# Patient Record
Sex: MALE | Race: WHITE | NOT HISPANIC OR LATINO | URBAN - METROPOLITAN AREA
[De-identification: names, ages, dates, MRNs, and addresses within clinical notes are randomized per-mention and may not be internally consistent; named-entity substitution may affect disease eponyms.]

---

## 2019-04-10 ENCOUNTER — INPATIENT (INPATIENT)
Facility: HOSPITAL | Age: 69
LOS: 1 days | Discharge: HOME | End: 2019-04-12
Payer: MEDICARE

## 2019-04-10 VITALS
DIASTOLIC BLOOD PRESSURE: 83 MMHG | SYSTOLIC BLOOD PRESSURE: 185 MMHG | TEMPERATURE: 97 F | HEART RATE: 73 BPM | RESPIRATION RATE: 18 BRPM | OXYGEN SATURATION: 100 %

## 2019-04-10 VITALS
HEART RATE: 61 BPM | OXYGEN SATURATION: 99 % | SYSTOLIC BLOOD PRESSURE: 124 MMHG | TEMPERATURE: 98 F | RESPIRATION RATE: 18 BRPM | DIASTOLIC BLOOD PRESSURE: 65 MMHG

## 2019-04-10 LAB
APTT BLD: 39.5 SEC — HIGH (ref 27–39.2)
INR BLD: 1.11 RATIO — SIGNIFICANT CHANGE UP (ref 0.65–1.3)
PROTHROM AB SERPL-ACNC: 12.7 SEC — SIGNIFICANT CHANGE UP (ref 9.95–12.87)
TROPONIN T SERPL-MCNC: <0.01 NG/ML — SIGNIFICANT CHANGE UP

## 2019-04-10 PROCEDURE — 71046 X-RAY EXAM CHEST 2 VIEWS: CPT | Mod: 26

## 2019-04-10 PROCEDURE — 93458 L HRT ARTERY/VENTRICLE ANGIO: CPT | Mod: 26

## 2019-04-10 PROCEDURE — 93010 ELECTROCARDIOGRAM REPORT: CPT

## 2019-04-10 PROCEDURE — 99285 EMERGENCY DEPT VISIT HI MDM: CPT | Mod: 25

## 2019-04-10 RX ORDER — ENOXAPARIN SODIUM 100 MG/ML
40 INJECTION SUBCUTANEOUS DAILY
Qty: 0 | Refills: 0 | Status: DISCONTINUED | OUTPATIENT
Start: 2019-04-11 | End: 2019-04-12

## 2019-04-10 RX ORDER — ASPIRIN/CALCIUM CARB/MAGNESIUM 324 MG
0 TABLET ORAL
Qty: 0 | Refills: 0 | COMMUNITY

## 2019-04-10 RX ORDER — OMEPRAZOLE 10 MG/1
1 CAPSULE, DELAYED RELEASE ORAL
Qty: 0 | Refills: 0 | COMMUNITY

## 2019-04-10 RX ORDER — SODIUM CHLORIDE 9 MG/ML
1000 INJECTION INTRAMUSCULAR; INTRAVENOUS; SUBCUTANEOUS
Qty: 0 | Refills: 0 | Status: DISCONTINUED | OUTPATIENT
Start: 2019-04-10 | End: 2019-04-12

## 2019-04-10 RX ORDER — ATORVASTATIN CALCIUM 80 MG/1
1 TABLET, FILM COATED ORAL
Qty: 0 | Refills: 0 | COMMUNITY

## 2019-04-10 RX ADMIN — SODIUM CHLORIDE 125 MILLILITER(S): 9 INJECTION INTRAMUSCULAR; INTRAVENOUS; SUBCUTANEOUS at 10:10

## 2019-04-10 NOTE — DISCHARGE NOTE NURSING/CASE MANAGEMENT/SOCIAL WORK - NSDCDPATPORTLINK_GEN_ALL_CORE
You can access the [facility] Patient Portal, offered by [facility], by registering with the following website: [email or web address]

## 2019-04-10 NOTE — H&P ADULT - ASSESSMENT
Patient is a 70 y/o M PMH of HLD, GERD who presents with chest pain. Pt has + stress test as outpatient. CE negative x 2, EKG unchanged.     1) Chest pain  -admit to telemetry  -serial trop  -Pt will be taken today for cardiac cath with   -NPO  -IVF  -EKG rpt in AM  -NTG prn CP  -EKG prn CP   -am cbc, cmp, mg, phos  -ASA and statin continued    2Hyperlipidemia  -c/w statin     3) GERD  -PPI     4)DVT ppx  -Lovenox tomorrow

## 2019-04-10 NOTE — ED PROVIDER NOTE - OBJECTIVE STATEMENT
67 yo m pmhx sig for former smoker, HLD, positive stress test pw quick onset of midsternal pressure mod in severity CP radiating to L shoulder at 2300 with no provoking or modifying factors. The CP is non exertional and non positional. Since the onset of pain pt had some improvement in severity. The CP is not associated with N/V or diaphoresis. No unilateral swelling, hemoptysis, estogen supplementation, malignancy, recent immobilization or surgery, or prior DVT/PE.    I have reviewed available current nursing and previous documentation of past medical, surgical, family, and/or social history.

## 2019-04-10 NOTE — ED ADULT NURSE NOTE - CHPI ED NUR SYMPTOMS NEG
no chills/no syncope/no congestion/no diaphoresis/no fever/no shortness of breath/no nausea/no dizziness/no vomiting/no back pain

## 2019-04-10 NOTE — H&P ADULT - NSHPLABSRESULTS_GEN_ALL_CORE
15.9   5.08  )-----------( 233      ( 10 Apr 2019 04:00 )             47.7       04-10    143  |  105  |  18  ----------------------------<  95  5.0   |  26  |  0.9    Ca    9.8      10 Apr 2019 04:00    TPro  7.2  /  Alb  4.7  /  TBili  0.3  /  DBili  x   /  AST  24  /  ALT  20  /  AlkPhos  67  04-10                  PT/INR - ( 10 Apr 2019 06:26 )   PT: 12.70 sec;   INR: 1.11 ratio         PTT - ( 10 Apr 2019 06:26 )  PTT:39.5 sec    Lactate Trend      CARDIAC MARKERS ( 10 Apr 2019 06:26 )  x     / <0.01 ng/mL / x     / x     / x      CARDIAC MARKERS ( 10 Apr 2019 04:00 )  x     / <0.01 ng/mL / x     / x     / x            CAPILLARY BLOOD GLUCOSE        < from: 12 Lead ECG (04.10.19 @ 03:48) >    Diagnosis Line Sinus rhythm with Premature supraventricular complexes  Incomplete right bundle branch block  Borderline ECG    Confirmed by Leon Hussein ([id number]) on 4/10/2019 7:06:48 AM    < end of copied text >

## 2019-04-10 NOTE — H&P ADULT - HISTORY OF PRESENT ILLNESS
Patient is a 68 y/o M PMH of HLD, GERD who presents with chest pain that began at 10pm last night . Patient describes the site as initially epigastric which he attributed to GERD and ignored. The pain returned to the substernal area, described as a burning type pain, 6/10 intensity with no radiation. He had no associated symptoms and th pain lasted over 1 hour when he called his PMD who instructed him to come to the ED. He admits to intermittent chest pain over the last few month for which his PMD did a stress test which was positive. He denies n/v/f/c, Shortness of breath, weight loss night sweats.

## 2019-04-10 NOTE — H&P ADULT - NSHPREVIEWOFSYSTEMS_GEN_ALL_CORE
REVIEW OF SYSTEMS:    CONSTITUTIONAL: No weakness, fevers or chills  EYES/ENT: No visual changes;  No vertigo or throat pain   NECK: No pain or stiffness  RESPIRATORY: No cough, wheezing, hemoptysis; No shortness of breath  CARDIOVASCULAR: No chest pain or palpitations  GASTROINTESTINAL: No abdominal or epigastric pain. No nausea, vomiting, or hematemesis; No diarrhea or constipation. No melena or hematochezia.  GENITOURINARY: No dysuria, frequency or hematuria  MUSCULOSKELETAL: No joint pains or myalgias  NEUROLOGICAL: No numbness or weakness  SKIN: No itching, rashes

## 2019-04-10 NOTE — ED PROVIDER NOTE - ATTENDING CONTRIBUTION TO CARE
69 yo M, history of HLD, smoking, recent reportedly negative stress by Dr. Hernandez, here for assessment of L sided CP without associated sx. No nausea, vomiting, dizziness, dyspnea.    VS and exam unremarkable, clear lungs, RRR, no LE edema.    Will get EKG, labs, page Dr. Hernandez and reassess

## 2019-04-10 NOTE — ED PROVIDER NOTE - NS ED ROS FT
Review of Systems    Constitutional: (-) fever  Eyes/ENT: (-) change in vision, (-) sore throat, (-) ear pain  Cardiovascular: (-) palpitation   Respiratory: (-) cough, (-) shortness of breath  Gastrointestinal: (-) abdominal pain (-) vomiting, (-) diarrhea  Musculoskeletal: (-) neck pain, (-) back pain, (-) joint pain  Integumentary: (-) rash, (-) edema  Neurological: (-) headache, (-) altered mental status  Heme/Lymph: (-) easy bruising (-) easy bleeding (-) lymphadenopathy  Allergic/Immunologic: (-) pruritus

## 2019-04-10 NOTE — ED PROVIDER NOTE - CLINICAL SUMMARY MEDICAL DECISION MAKING FREE TEXT BOX
Patient remained pain free and had largely unremarkable workup, however, upon speaking to Dr. Hernandez, it turns out that he actually had a POSITIVE stress test and therefore needs admission for cath.

## 2019-04-10 NOTE — ED PROVIDER NOTE - PROGRESS NOTE DETAILS
As per Dr Hernandez pt will be catherised in the AM; pt had a positive stress test earlier in the month, NPO

## 2019-04-10 NOTE — H&P ADULT - NSHPSOCIALHISTORY_GEN_ALL_CORE
Marital Status:  ( x  )    (   ) Single    (   )    (  )   Occupation:   Lives with: (  ) alone  (  ) children   ( x ) spouse   (  ) parents  (  ) other  Recent Travel: no    Substance Use (street drugs): (  x) never used  (  ) other:  Tobacco Usage:  (   ) never smoked   ( x  ) former smoker   (   ) current smoker  (     ) pack year  Alcohol Usage: no  Sexual History:  no

## 2019-04-10 NOTE — CHART NOTE - NSCHARTNOTEFT_GEN_A_CORE
PREOPERATIVE DAY OF PROCEDURE EVALUATION:  I have personally seen and examined the patient.  I agree with the history and physical which I have reviewed and noted any changes below.  (Signed electronically by __________)  04-10-19 @ 18:39 PREOPERATIVE DAY OF PROCEDURE EVALUATION:  I have personally seen and examined the patient.  I agree with the history and physical which I have reviewed and noted any changes below.  ( 04-10-19 @ 18:39

## 2019-04-10 NOTE — ED PROVIDER NOTE - PHYSICAL EXAMINATION
Physical Exam    Vital Signs: I have reviewed the initial vital signs.  Constitutional: well-nourished, appears stated age, no acute distress  Eyes: PERRLA, and symmetrical lids.  ENT: Neck supple with no adenopathy, moist MM.  Cardiovascular: regular rate, regular rhythm, well-perfused extremities  Respiratory: unlabored respiratory effort, clear to auscultation bilaterally  Gastrointestinal: soft, non-tender abdomen, no pulsatile mass  Musculoskeletal: supple neck, no lower extremity edema  Integumentary: warm, dry, no rash  Neurologic: awake, alert, extremities’ motor and sensory functions grossly intact  Psychiatric: A&Ox3, appropriate mood, appropriate affect

## 2019-04-10 NOTE — DISCHARGE NOTE PROVIDER - HOSPITAL COURSE
Patient is a 70 y/o M PMH of HLD, GERD who presents with chest pain that began at 10pm last night . Patient describes the site as initially epigastric which he attributed to GERD and ignored. The pain returned to the substernal area, described as a burning type pain, 6/10 intensity with no radiation. Patient had positive stress. Went for cardiac cath Patient is a 68 y/o M PMH of HLD, GERD who presents with chest pain that began at 10pm last night . Patient describes the site as initially epigastric which he attributed to GERD and ignored. The pain returned to the substernal area, described as a burning type pain, 6/10 intensity with no radiation. Patient had positive stress. Went for cardiac cath which showed no significant CAD. Patient is cleared for discharge with no change to home medications.

## 2019-04-10 NOTE — DISCHARGE NOTE PROVIDER - NSDCCPCAREPLAN_GEN_ALL_CORE_FT
PRINCIPAL DISCHARGE DIAGNOSIS  Diagnosis: Chest pain  Assessment and Plan of Treatment: PRINCIPAL DISCHARGE DIAGNOSIS  Diagnosis: Chest pain  Assessment and Plan of Treatment: Cardiac catheterization negative for occlusive coronary artery disease

## 2019-04-10 NOTE — CHART NOTE - NSCHARTNOTEFT_GEN_A_CORE
Preliminary Cardiac Catheterization Post-Procedure Report:04-10-19 @ 19:06    Procedure Performed:  [x ] Left Heart Catheterization  [ ] Right Heart Catheterization  [ ] Percutaneous Coronary Intervention    Primary Physician: , Dr.Kandov MENON  Assistant(s): Dr. Lei MD    Preliminary Procedure Summary (Official full report to follow)    Pre-procedure diagnosis: abnormal stress test  Post Procedure Diagnosis/Impression: No significant CAD    Left Heart Catheterization:    [ ] Normal Coronary Arteries  [ ] Luminal Irregularities  [ ] ** vessel coronary artery disease   [x ] Non Obstructive CAD    Anesthesia Type  [x] conscious sedation  [x] local/regional anesthesia  [  ] general anesthesia    Estimated Blood Loss  [x ] less than 30 ml    Amount of Contrast used:  25 ml    Access  [ ] Rt. Femoral A  [ ] Rt. Femoral V  [x ] Rt. Radial A  [ ] Rt. Brachial V    Condition of patient after procedure  [x] stable  [  ] guarded  [  ] satisfactory     CATH SUMMARY/FINDINGS:    Dominance:   [x ] Right  [ ] Left  [ ] Co dominant                  LM:     Normal, short  LAD:    Mild diffuse disease in proximal segment                    Diag:   Normal  Cx:      Mild disease  RCA:    minor irregularities   RPL:     minor irregularities  R/LPDA: Normal    [ ] LHC w/ grafts  LIMA to LAD *is patent/*not patent  SVG to  SVG to  Other    Right Heart Catheterization:  PA:  PCW:  CO/CI:    [ ] AS  [ ] AI  [ ] MR  [ ] MS    Specimens obtained: n/a    Implants: ANGELA x *    Follow-up Care:  [x ] D/C Home today  [ ] Return to In-patient bed  [ ] Admit to:  [ ] Return for staged procedure:  [ ] CT Surgery consult called  [ ] DAPT, statin, BB, ACEI  [x ] intensive medical management  [ ] ** EPS/nephrology evaluation requested Preliminary Cardiac Catheterization Post-Procedure Report:04-10-19 @ 19:06    Procedure Performed:  [x ] Left Heart Catheterization  [ ] Right Heart Catheterization  [ ] Percutaneous Coronary Intervention    Primary Physician: , Dr.Kandov MENON  Assistant(s): Dr. Lei MD    Preliminary Procedure Summary (Official full report to follow)    Pre-procedure diagnosis: abnormal stress test  Post Procedure Diagnosis/Impression: No significant CAD    Left Heart Catheterization:    [ ] Normal Coronary Arteries  [ ] Luminal Irregularities  [ ] ** vessel coronary artery disease   [x ] Non Obstructive CAD    Anesthesia Type  [x] conscious sedation  [x] local/regional anesthesia  [  ] general anesthesia    Estimated Blood Loss  [x ] less than 30 ml    Amount of Contrast used:  25 ml    Access  [ ] Rt. Femoral A  [ ] Rt. Femoral V  [x ] Rt. Radial A    Condition of patient after procedure  [x] stable  [  ] guarded  [  ] satisfactory     CATH SUMMARY/FINDINGS:    Dominance:   [x ] Right  [ ] Left  [ ] Co dominant                  LM:     Normal, short  LAD:    Mild diffuse disease in proximal segment                    Diag:   Normal  Cx:      Mild disease  RCA:    minor irregularities   RPL:     minor irregularities  R/LPDA: Normal    [ ] LHC w/ grafts  LIMA to LAD *is patent/*not patent  SVG to  SVG to  Other    Right Heart Catheterization:  PA:  PCW:  CO/CI:    [ ] AS  [ ] AI  [ ] MR  [ ] MS    Specimens obtained: n/a    Implants: ANGELA x *    Follow-up Care:  [x ] D/C Home today  [ ] Return to In-patient bed  [ ] Admit to:  [ ] Return for staged procedure:  [ ] CT Surgery consult called  [ ] DAPT, statin, BB, ACEI  [x ] intensive medical management  [ ] ** EPS/nephrology evaluation requested

## 2019-04-16 DIAGNOSIS — E78.5 HYPERLIPIDEMIA, UNSPECIFIED: ICD-10-CM

## 2019-04-16 DIAGNOSIS — Z87.891 PERSONAL HISTORY OF NICOTINE DEPENDENCE: ICD-10-CM

## 2019-04-16 DIAGNOSIS — K21.9 GASTRO-ESOPHAGEAL REFLUX DISEASE WITHOUT ESOPHAGITIS: ICD-10-CM

## 2019-04-16 DIAGNOSIS — R94.39 ABNORMAL RESULT OF OTHER CARDIOVASCULAR FUNCTION STUDY: ICD-10-CM

## 2019-04-16 DIAGNOSIS — I25.119 ATHEROSCLEROTIC HEART DISEASE OF NATIVE CORONARY ARTERY WITH UNSPECIFIED ANGINA PECTORIS: ICD-10-CM

## 2019-04-16 DIAGNOSIS — R07.89 OTHER CHEST PAIN: ICD-10-CM

## 2019-12-25 NOTE — DISCHARGE NOTE PROVIDER - NSDCHC_MEDRECSTATUS_GEN_ALL_CORE
Patient has been visible on unit  Remains Labile  During interaction patient was calm and controlled  No loud outbursts  Attempting to get a hold of his mother on the patient phone  Denies SI/HI/hallucinations  Will continue to monitor  Admission Reconciliation is Completed  Discharge Reconciliation is Not Complete Admission Reconciliation is Completed  Discharge Reconciliation is Completed

## 2020-07-28 NOTE — ED ADULT NURSE NOTE - RESPIRATORY WDL
Breathing spontaneous and unlabored. Breath sounds clear and equal bilaterally with regular rhythm. TRANSFER

## 2022-08-09 NOTE — ED PROVIDER NOTE - CADM POA PRESS ULCER
[facility]  coding opportunities       Chart reviewed, no opportunity found:   [address]        Patients Insurance     Medicare Insurance: [employer]
No

## 2023-10-18 NOTE — ED ADULT NURSE NOTE - NSFALLRSKASSESASSIST_ED_ALL_ED
Detail Level: Detailed Show Applicator Variable?: Yes Duration Of Freeze Thaw-Cycle (Seconds): 0 Post-Care Instructions: I reviewed with the patient in detail post-care instructions. Patient is to wear sunprotection, and avoid picking at any of the treated lesions. Pt may apply Vaseline to crusted or scabbing areas. Render Post-Care Instructions In Note?: no Consent: The patient's consent was obtained including but not limited to risks of crusting, scabbing, blistering, scarring, darker or lighter pigmentary change, recurrence, incomplete removal and infection. no

## 2025-05-16 PROBLEM — E78.5 HYPERLIPIDEMIA, UNSPECIFIED: Chronic | Status: ACTIVE | Noted: 2019-04-10

## 2025-05-16 PROBLEM — K21.9 GASTRO-ESOPHAGEAL REFLUX DISEASE WITHOUT ESOPHAGITIS: Chronic | Status: ACTIVE | Noted: 2019-04-10

## 2025-05-27 ENCOUNTER — APPOINTMENT (OUTPATIENT)
Dept: SURGERY | Facility: CLINIC | Age: 75
End: 2025-05-27

## 2025-05-27 ENCOUNTER — APPOINTMENT (OUTPATIENT)
Dept: SURGERY | Facility: CLINIC | Age: 75
End: 2025-05-27
Payer: MEDICARE

## 2025-05-27 VITALS
WEIGHT: 170 LBS | HEIGHT: 69 IN | OXYGEN SATURATION: 98 % | BODY MASS INDEX: 25.18 KG/M2 | DIASTOLIC BLOOD PRESSURE: 70 MMHG | SYSTOLIC BLOOD PRESSURE: 132 MMHG | HEART RATE: 71 BPM

## 2025-05-27 DIAGNOSIS — E78.5 HYPERLIPIDEMIA, UNSPECIFIED: ICD-10-CM

## 2025-05-27 DIAGNOSIS — K80.20 CALCULUS OF GALLBLADDER W/OUT CHOLECYSTITIS W/OUT OBSTRUCTION: ICD-10-CM

## 2025-05-27 DIAGNOSIS — K21.9 GASTRO-ESOPHAGEAL REFLUX DISEASE W/OUT ESOPHAGITIS: ICD-10-CM

## 2025-05-27 PROBLEM — Z00.00 ENCOUNTER FOR PREVENTIVE HEALTH EXAMINATION: Status: ACTIVE | Noted: 2025-05-27

## 2025-05-27 PROCEDURE — 99205 OFFICE O/P NEW HI 60 MIN: CPT

## 2025-05-27 RX ORDER — OMEPRAZOLE 40 MG/1
CAPSULE, DELAYED RELEASE ORAL
Refills: 0 | Status: ACTIVE | COMMUNITY

## 2025-05-27 RX ORDER — ATORVASTATIN CALCIUM 20 MG/1
20 TABLET, FILM COATED ORAL
Refills: 0 | Status: ACTIVE | COMMUNITY

## 2025-05-29 ENCOUNTER — NON-APPOINTMENT (OUTPATIENT)
Age: 75
End: 2025-05-29

## 2025-06-09 ENCOUNTER — OUTPATIENT (OUTPATIENT)
Dept: OUTPATIENT SERVICES | Facility: HOSPITAL | Age: 75
LOS: 1 days | End: 2025-06-09
Payer: MEDICARE

## 2025-06-09 VITALS
RESPIRATION RATE: 16 BRPM | HEART RATE: 60 BPM | SYSTOLIC BLOOD PRESSURE: 122 MMHG | HEIGHT: 69 IN | OXYGEN SATURATION: 99 % | WEIGHT: 171.08 LBS | DIASTOLIC BLOOD PRESSURE: 70 MMHG | TEMPERATURE: 97 F

## 2025-06-09 DIAGNOSIS — K80.20 CALCULUS OF GALLBLADDER WITHOUT CHOLECYSTITIS WITHOUT OBSTRUCTION: ICD-10-CM

## 2025-06-09 DIAGNOSIS — Z98.890 OTHER SPECIFIED POSTPROCEDURAL STATES: Chronic | ICD-10-CM

## 2025-06-09 DIAGNOSIS — Z01.818 ENCOUNTER FOR OTHER PREPROCEDURAL EXAMINATION: ICD-10-CM

## 2025-06-09 DIAGNOSIS — M86.10 OTHER ACUTE OSTEOMYELITIS, UNSPECIFIED SITE: Chronic | ICD-10-CM

## 2025-06-09 LAB
ALBUMIN SERPL ELPH-MCNC: 4.6 G/DL — SIGNIFICANT CHANGE UP (ref 3.5–5.2)
ALP SERPL-CCNC: 70 U/L — SIGNIFICANT CHANGE UP (ref 30–115)
ALT FLD-CCNC: 15 U/L — SIGNIFICANT CHANGE UP (ref 0–41)
ANION GAP SERPL CALC-SCNC: 10 MMOL/L — SIGNIFICANT CHANGE UP (ref 7–14)
AST SERPL-CCNC: 22 U/L — SIGNIFICANT CHANGE UP (ref 0–41)
BASOPHILS # BLD AUTO: 0.02 K/UL — SIGNIFICANT CHANGE UP (ref 0–0.2)
BASOPHILS NFR BLD AUTO: 0.4 % — SIGNIFICANT CHANGE UP (ref 0–1)
BILIRUB SERPL-MCNC: 0.5 MG/DL — SIGNIFICANT CHANGE UP (ref 0.2–1.2)
BUN SERPL-MCNC: 14 MG/DL — SIGNIFICANT CHANGE UP (ref 10–20)
CALCIUM SERPL-MCNC: 9.9 MG/DL — SIGNIFICANT CHANGE UP (ref 8.4–10.5)
CHLORIDE SERPL-SCNC: 105 MMOL/L — SIGNIFICANT CHANGE UP (ref 98–110)
CO2 SERPL-SCNC: 24 MMOL/L — SIGNIFICANT CHANGE UP (ref 17–32)
CREAT SERPL-MCNC: 1 MG/DL — SIGNIFICANT CHANGE UP (ref 0.7–1.5)
EGFR: 79 ML/MIN/1.73M2 — SIGNIFICANT CHANGE UP
EGFR: 79 ML/MIN/1.73M2 — SIGNIFICANT CHANGE UP
EOSINOPHIL # BLD AUTO: 0.12 K/UL — SIGNIFICANT CHANGE UP (ref 0–0.7)
EOSINOPHIL NFR BLD AUTO: 2.4 % — SIGNIFICANT CHANGE UP (ref 0–8)
GLUCOSE SERPL-MCNC: 89 MG/DL — SIGNIFICANT CHANGE UP (ref 70–99)
HCT VFR BLD CALC: 49.1 % — SIGNIFICANT CHANGE UP (ref 42–52)
HGB BLD-MCNC: 16.5 G/DL — SIGNIFICANT CHANGE UP (ref 14–18)
IMM GRANULOCYTES NFR BLD AUTO: 0.2 % — SIGNIFICANT CHANGE UP (ref 0.1–0.3)
LYMPHOCYTES # BLD AUTO: 1.52 K/UL — SIGNIFICANT CHANGE UP (ref 1.2–3.4)
LYMPHOCYTES # BLD AUTO: 30.3 % — SIGNIFICANT CHANGE UP (ref 20.5–51.1)
MCHC RBC-ENTMCNC: 31 PG — SIGNIFICANT CHANGE UP (ref 27–31)
MCHC RBC-ENTMCNC: 33.6 G/DL — SIGNIFICANT CHANGE UP (ref 32–37)
MCV RBC AUTO: 92.3 FL — SIGNIFICANT CHANGE UP (ref 80–94)
MONOCYTES # BLD AUTO: 0.37 K/UL — SIGNIFICANT CHANGE UP (ref 0.1–0.6)
MONOCYTES NFR BLD AUTO: 7.4 % — SIGNIFICANT CHANGE UP (ref 1.7–9.3)
NEUTROPHILS # BLD AUTO: 2.98 K/UL — SIGNIFICANT CHANGE UP (ref 1.4–6.5)
NEUTROPHILS NFR BLD AUTO: 59.3 % — SIGNIFICANT CHANGE UP (ref 42.2–75.2)
NRBC BLD AUTO-RTO: 0 /100 WBCS — SIGNIFICANT CHANGE UP (ref 0–0)
PLATELET # BLD AUTO: 226 K/UL — SIGNIFICANT CHANGE UP (ref 130–400)
PMV BLD: 11.4 FL — HIGH (ref 7.4–10.4)
POTASSIUM SERPL-MCNC: 4.7 MMOL/L — SIGNIFICANT CHANGE UP (ref 3.5–5)
POTASSIUM SERPL-SCNC: 4.7 MMOL/L — SIGNIFICANT CHANGE UP (ref 3.5–5)
PROT SERPL-MCNC: 7.4 G/DL — SIGNIFICANT CHANGE UP (ref 6–8)
RBC # BLD: 5.32 M/UL — SIGNIFICANT CHANGE UP (ref 4.7–6.1)
RBC # FLD: 13 % — SIGNIFICANT CHANGE UP (ref 11.5–14.5)
SODIUM SERPL-SCNC: 139 MMOL/L — SIGNIFICANT CHANGE UP (ref 135–146)
WBC # BLD: 5.02 K/UL — SIGNIFICANT CHANGE UP (ref 4.8–10.8)
WBC # FLD AUTO: 5.02 K/UL — SIGNIFICANT CHANGE UP (ref 4.8–10.8)

## 2025-06-09 PROCEDURE — 99214 OFFICE O/P EST MOD 30 MIN: CPT | Mod: 25

## 2025-06-09 PROCEDURE — 80053 COMPREHEN METABOLIC PANEL: CPT

## 2025-06-09 PROCEDURE — 93005 ELECTROCARDIOGRAM TRACING: CPT

## 2025-06-09 PROCEDURE — 36415 COLL VENOUS BLD VENIPUNCTURE: CPT

## 2025-06-09 PROCEDURE — 93010 ELECTROCARDIOGRAM REPORT: CPT

## 2025-06-09 PROCEDURE — 85025 COMPLETE CBC W/AUTO DIFF WBC: CPT

## 2025-06-09 NOTE — H&P PST ADULT - HEART RATE (BEATS/MIN)
"Attempted to contact patient, voicemail state ""customer that you are try to reach is not available at this time, good-bye.\""   Will attempt to call patient at a later time.   " 60

## 2025-06-09 NOTE — H&P PST ADULT - NSICDXPASTMEDICALHX_GEN_ALL_CORE_FT
PAST MEDICAL HISTORY:  GERD without esophagitis     Hyperlipidemia      PAST MEDICAL HISTORY:  AF (atrial fibrillation)     GERD without esophagitis     Hyperlipidemia

## 2025-06-09 NOTE — H&P PST ADULT - NSICDXPASTSURGICALHX_GEN_ALL_CORE_FT
PAST SURGICAL HISTORY:  No significant past surgical history      PAST SURGICAL HISTORY:  Acute osteomyelitis     S/P ablation of atrial fibrillation

## 2025-06-09 NOTE — H&P PST ADULT - HISTORY OF PRESENT ILLNESS
73 Y/O MALE PT TO PAST WITH C/O RUQ PAIN           PT NOW FOR SCHEDULED PROCEDURE ( LAP CHOLEY  )  PT DENIES ANY CP SOB PALP COUGH DYSURIA FEVER URI.   Anesthesia Alert  NO--Difficult Airway  NO--History of neck surgery or radiation  NO--Limited ROM of neck  NO--History of Malignant hyperthermia  NO--Personal or family history of Pseudocholinesterase deficiency.  NO--Prior Anesthesia Complication  NO--Latex Allergy  NO--Loose teeth  NO--History of Rheumatoid Arthritis  NO--KENYON  NO--Bleeding risk  NO--Other_____   73 Y/O MALE PT TO PAST WITH C/O RUQ PAIN , N/V  4/25   PT NOW FOR SCHEDULED PROCEDURE ( LAP CHOLEY  )  PT DENIES ANY CP SOB PALP COUGH DYSURIA FEVER URI.   Anesthesia Alert  NO--Difficult Airway  NO--History of neck surgery or radiation  NO--Limited ROM of neck  NO--History of Malignant hyperthermia  NO--Personal or family history of Pseudocholinesterase deficiency.  NO--Prior Anesthesia Complication  NO--Latex Allergy  NO--Loose teeth  NO--History of Rheumatoid Arthritis  NO--KENYON  NO--Bleeding risk  NO--Other_____  RCRI CLASS I  DASI 9 METS

## 2025-06-10 DIAGNOSIS — K80.20 CALCULUS OF GALLBLADDER WITHOUT CHOLECYSTITIS WITHOUT OBSTRUCTION: ICD-10-CM

## 2025-06-10 DIAGNOSIS — Z01.818 ENCOUNTER FOR OTHER PREPROCEDURAL EXAMINATION: ICD-10-CM

## 2025-06-16 PROBLEM — I48.91 UNSPECIFIED ATRIAL FIBRILLATION: Chronic | Status: ACTIVE | Noted: 2025-06-09

## 2025-06-17 NOTE — ASU PATIENT PROFILE, ADULT - NSICDXPASTMEDICALHX_GEN_ALL_CORE_FT
PAST MEDICAL HISTORY:  AF (atrial fibrillation)     GERD without esophagitis     Hyperlipidemia

## 2025-06-17 NOTE — ASU PATIENT PROFILE, ADULT - TEACHING/LEARNING RELIGIOUS CONSIDERATIONS
Verified Results  [facility]) Cardiovascular Risk Assessment [id number] 10:45AM Len Rianldi     Test Name Result Flag Reference   Interpretation Note     Supplement report is available  PDF Image   (1) COMPREHENSIVE METABOLIC PANEL [id number] 29:70JH Len Rinaldi     Test Name Result Flag Reference   Glucose, Serum 82 mg/dL  65-99   BUN 7 mg/dL  6-20   Creatinine, Serum 0 66 mg/dL  0 57-1 00   eGFR If NonAfricn Am 112 mL/min/1 73  >59   eGFR If Africn Am 130 mL/min/1 73  >59   BUN/Creatinine Ratio 11  8-20   Sodium, Serum 137 mmol/L  134-144   Potassium, Serum 4 5 mmol/L  3 5-5 2   Chloride, Serum 98 mmol/L     Carbon Dioxide, Total 22 mmol/L  18-29   Calcium, Serum 9 3 mg/dL  8 7-10 2   Protein, Total, Serum 7 1 g/dL  6 0-8 5   Albumin, Serum 4 2 g/dL  3 5-5 5   Globulin, Total 2 9 g/dL  1 5-4 5   A/G Ratio 1 4  1 1-2 5   Bilirubin, Total 0 4 mg/dL  0 0-1 2   Alkaline Phosphatase, S 48 IU/L     AST (SGOT) 17 IU/L  0-40   ALT (SGPT) 16 IU/L  0-32     (1) LIPID PANEL, FASTING [id number] 10:45AM Len Rinaldi     Test Name Result Flag Reference   Cholesterol, Total 177 mg/dL  100-199   Triglycerides 132 mg/dL  0-149   HDL Cholesterol 63 mg/dL  >39   VLDL Cholesterol Robert 26 mg/dL  5-40   LDL Cholesterol Calc 88 mg/dL  0-99   T  Chol/HDL Ratio 2 8 ratio units  0 0-4 4   T  Chol/HDL Ratio                                                             Men  Women                                               1/2 Avg  Risk  3 4    3 3                                                   Avg Risk  5 0    4 4                                                2X Avg  Risk  9 6    7 1                                                3X Avg  Risk 23 4   11 0     (1) TSH [id number] 10:45AM Len Rinaldi     Test Name Result Flag Reference   TSH 1 300 uIU/mL  0 450-4 500     (1) CBC/PLT/DIFF [id number] 10:45AM Len Rinaldi     Test Name Result Flag Reference   WBC 10 4 x10E3/uL  3 4-10 8   RBC 5 08 x10E6/uL  3 77-5 28 Hemoglobin 14 4 g/dL  11 1-15 9   Hematocrit 42 9 %  34 0-46  6   MCV 84 fL  79-97   MCH 28 3 pg  26 6-33 0   MCHC 33 6 g/dL  31 5-35 7   RDW 14 2 %  12 3-15 4   Platelets 217 Y58V1/PN  150-379   Neutrophils 65 %     Lymphs 28 %     Monocytes 6 %     Eos 1 %     Basos 0 %     Neutrophils (Absolute) 6 7 x10E3/uL  1 4-7 0   Lymphs (Absolute) 2 9 x10E3/uL  0 7-3 1   Monocytes(Absolute) 0 6 x10E3/uL  0 1-0 9   Eos (Absolute) 0 2 x10E3/uL  0 0-0 4   Baso (Absolute) 0 0 x10E3/uL  0 0-0 2   Immature Granulocytes 0 %     Immature Grans (Abs) 0 0 x10E3/uL  0 0-0 1     [facility] CMP14 Default [id number] 10:45AM Abhi Sunshine     Test Name Result Flag Reference   Iftikhar Lang CMP14 Default Comment     A hand-written panel/profile was received from your office  In  accordance with the [facility] Ambiguous Test Code Policy dated July 3886, we have completed your order by using the closest currently  or formerly recognized AMA panel  We have assigned Comprehensive  Metabolic Panel (14), Test Code #118851 to this request   If this  is not the testing you wished to receive on this specimen, please  contact the [address] [place] [address] Client Inquiry/Technical Services Department  to clarify the test order  We appreciate your business         Discussion/Summary   will discuss labs at follow up appt none

## 2025-06-17 NOTE — ASU PATIENT PROFILE, ADULT - FALL HARM RISK - UNIVERSAL INTERVENTIONS
Bed in lowest position, wheels locked, appropriate side rails in place/Call bell, personal items and telephone in reach/Instruct patient to call for assistance before getting out of bed or chair/Non-slip footwear when patient is out of bed/Jeff to call system/Physically safe environment - no spills, clutter or unnecessary equipment/Purposeful Proactive Rounding/Room/bathroom lighting operational, light cord in reach

## 2025-06-18 ENCOUNTER — OUTPATIENT (OUTPATIENT)
Dept: OUTPATIENT SERVICES | Facility: HOSPITAL | Age: 75
LOS: 1 days | Discharge: HOME | End: 2025-06-18
Payer: MEDICARE

## 2025-06-18 ENCOUNTER — APPOINTMENT (OUTPATIENT)
Dept: SURGERY | Facility: HOSPITAL | Age: 75
End: 2025-06-18

## 2025-06-18 ENCOUNTER — RESULT REVIEW (OUTPATIENT)
Age: 75
End: 2025-06-18

## 2025-06-18 ENCOUNTER — TRANSCRIPTION ENCOUNTER (OUTPATIENT)
Age: 75
End: 2025-06-18

## 2025-06-18 VITALS
SYSTOLIC BLOOD PRESSURE: 136 MMHG | HEART RATE: 58 BPM | RESPIRATION RATE: 18 BRPM | OXYGEN SATURATION: 99 % | DIASTOLIC BLOOD PRESSURE: 72 MMHG | WEIGHT: 171.08 LBS | HEIGHT: 69 IN | TEMPERATURE: 97 F

## 2025-06-18 VITALS
SYSTOLIC BLOOD PRESSURE: 133 MMHG | OXYGEN SATURATION: 98 % | DIASTOLIC BLOOD PRESSURE: 63 MMHG | HEART RATE: 69 BPM | RESPIRATION RATE: 24 BRPM

## 2025-06-18 DIAGNOSIS — Z98.890 OTHER SPECIFIED POSTPROCEDURAL STATES: Chronic | ICD-10-CM

## 2025-06-18 DIAGNOSIS — K80.20 CALCULUS OF GALLBLADDER WITHOUT CHOLECYSTITIS WITHOUT OBSTRUCTION: ICD-10-CM

## 2025-06-18 DIAGNOSIS — M86.10 OTHER ACUTE OSTEOMYELITIS, UNSPECIFIED SITE: Chronic | ICD-10-CM

## 2025-06-18 PROCEDURE — C1889: CPT

## 2025-06-18 PROCEDURE — 47563 LAPARO CHOLECYSTECTOMY/GRAPH: CPT

## 2025-06-18 PROCEDURE — 88304 TISSUE EXAM BY PATHOLOGIST: CPT

## 2025-06-18 PROCEDURE — C9399: CPT

## 2025-06-18 PROCEDURE — S2900: CPT

## 2025-06-18 PROCEDURE — 88304 TISSUE EXAM BY PATHOLOGIST: CPT | Mod: 26

## 2025-06-18 RX ORDER — HYDROMORPHONE/SOD CHLOR,ISO/PF 2 MG/10 ML
0.5 SYRINGE (ML) INJECTION
Refills: 0 | Status: DISCONTINUED | OUTPATIENT
Start: 2025-06-18 | End: 2025-06-18

## 2025-06-18 RX ORDER — SODIUM CHLORIDE 9 G/1000ML
1000 INJECTION, SOLUTION INTRAVENOUS
Refills: 0 | Status: DISCONTINUED | OUTPATIENT
Start: 2025-06-18 | End: 2025-06-18

## 2025-06-18 RX ORDER — VITAMIN E (DL,TOCOPHERYL ACET) 22.5 MG/ML
0 DROPS ORAL
Refills: 0 | DISCHARGE

## 2025-06-18 RX ORDER — INDOCYANINE GREEN AND WATER 25 MG
25 KIT INJECTION ONCE
Refills: 0 | Status: DISCONTINUED | OUTPATIENT
Start: 2025-06-18 | End: 2025-06-18

## 2025-06-18 RX ORDER — OXYCODONE HYDROCHLORIDE 30 MG/1
5 TABLET ORAL ONCE
Refills: 0 | Status: DISCONTINUED | OUTPATIENT
Start: 2025-06-18 | End: 2025-06-18

## 2025-06-18 RX ORDER — L.ACID/B.ANIMALIS,BIFI,INF,LON 3B CELL
1 CAPSULE ORAL
Refills: 0 | DISCHARGE

## 2025-06-18 RX ORDER — ACETAMINOPHEN 500 MG/5ML
650 LIQUID (ML) ORAL ONCE
Refills: 0 | Status: DISCONTINUED | OUTPATIENT
Start: 2025-06-18 | End: 2025-06-18

## 2025-06-18 RX ORDER — AMOXICILLIN AND CLAVULANATE POTASSIUM 500; 125 MG/1; MG/1
1 TABLET, FILM COATED ORAL
Qty: 10 | Refills: 0
Start: 2025-06-18 | End: 2025-06-22

## 2025-06-18 RX ADMIN — SODIUM CHLORIDE 100 MILLILITER(S): 9 INJECTION, SOLUTION INTRAVENOUS at 09:49

## 2025-06-18 NOTE — BRIEF OPERATIVE NOTE - NSICDXBRIEFPROCEDURE_GEN_ALL_CORE_FT
PROCEDURES:  Robot-assisted laparoscopic cholecystectomy using indocyanine green (ICG) fluorescence imaging system and da Starla X 18-Jun-2025 09:47:57  Sim Dietz

## 2025-06-18 NOTE — ASU DISCHARGE PLAN (ADULT/PEDIATRIC) - FINANCIAL ASSISTANCE
[facility] provides services at a reduced cost to those who are determined to be eligible through [facility]’s financial assistance program. Information regarding [facility]’s financial assistance program can be found by going to [email or web address] or by calling [phone].

## 2025-06-18 NOTE — CHART NOTE - NSCHARTNOTEFT_GEN_A_CORE
PACU ANESTHESIA ADMISSION NOTE    Procedure:   Post op diagnosis:      ____  Intubated  TV:______       Rate: ______      FiO2: ______  __x__  Patent Airway  __x__  Full return of protective reflexes  _x___  Full recovery from anesthesia / back to baseline     Vitals:       Sat:      100              BP:            155/74        R:          14  P: 89  T:     97.3        Mental Status:    _x___ Awake    ____ Alert     ____ Drowsy    ____ Sedated    Nausea/Vomiting:   __x__ NO    ____ Yes,   See Post - Op Orders          Pain Scale (0-10):    ____ Treatment:   _x___ None      ____ See Post - Op/PCA Orders    Post - Operative Fluids:    ____ Oral     __x__ See Post - Op Orders    Plan: Discharge:     __x__Home         _____Floor       _____Critical Care      _____  Other:_________________    Comments: no complications

## 2025-06-18 NOTE — ASU DISCHARGE PLAN (ADULT/PEDIATRIC) - ASU DC SPECIAL INSTRUCTIONSFT
Render Risk Assessment In Note?: no
Detail Level: Simple
Additional Notes: Ln2 offered right forehead. And left head.
Diet:    - Continue low fat diet diet as tolerated.     Activity:    - Avoid heavy lifting or straining (anything over 10-15 pounds) for at least 4 weeks.    - You may ambulate and otherwise resume normal daily activities as tolerated.    - Your abdominal binder may help with some discomfort while ambulating.    - Please take home your incentive spirometer and continue to use 10x/hour while awake.    Incisions:    - You may shower and allow soap and water to rinse over incisions.    - Please avoid scrubbing the areas and do not submerge your incisions in water for at least 2 weeks.    Medications:    - New medications: (Sent to [facility] [address])          *Augmentin 875mg-125mg, 2 times daily, for 5 days          *Please  these medications on your way home   - You may resume your home medications.    - You may take Tylenol 650mg every 6 hours and alternate with Ibuprofen 600mg every 8 hours for pain. You may find these medications over the counter at your local pharmacy.    Follow-up:    - Please call the office to schedule a follow-up appointment with Dr. Garay within 1-2 weeks, or follow-up as previously scheduled.    Please call the office or return to the ED with persistent fever greater than 100.4F, chest pain, shortness of breath, uncontrollable nausea/vomiting/abdominal pain, constipation, bloody bowel movements, abdominal distention, inability to tolerate oral intake.
Additional Notes: Extraction offered.

## 2025-06-18 NOTE — ASU DISCHARGE PLAN (ADULT/PEDIATRIC) - CARE PROVIDER_API CALL
Rukhsana Garay  Complex General Surgical Oncology  [address] [place] [address]  Phone: [phone]  Fax: [phone]  Established Patient  Follow Up Time: 2 weeks

## 2025-06-24 LAB — SURGICAL PATHOLOGY STUDY: SIGNIFICANT CHANGE UP

## 2025-06-25 DIAGNOSIS — E78.5 HYPERLIPIDEMIA, UNSPECIFIED: ICD-10-CM

## 2025-06-25 DIAGNOSIS — K80.10 CALCULUS OF GALLBLADDER WITH CHRONIC CHOLECYSTITIS WITHOUT OBSTRUCTION: ICD-10-CM

## 2025-06-25 DIAGNOSIS — I48.91 UNSPECIFIED ATRIAL FIBRILLATION: ICD-10-CM

## 2025-06-25 DIAGNOSIS — K80.20 CALCULUS OF GALLBLADDER WITHOUT CHOLECYSTITIS WITHOUT OBSTRUCTION: ICD-10-CM

## 2025-06-25 DIAGNOSIS — Z79.82 LONG TERM (CURRENT) USE OF ASPIRIN: ICD-10-CM

## 2025-07-01 ENCOUNTER — APPOINTMENT (OUTPATIENT)
Dept: SURGERY | Facility: CLINIC | Age: 75
End: 2025-07-01
Payer: MEDICARE

## 2025-07-01 VITALS
WEIGHT: 168 LBS | BODY MASS INDEX: 24.88 KG/M2 | HEART RATE: 92 BPM | DIASTOLIC BLOOD PRESSURE: 80 MMHG | TEMPERATURE: 97 F | HEIGHT: 69 IN | SYSTOLIC BLOOD PRESSURE: 120 MMHG | OXYGEN SATURATION: 99 %

## 2025-07-01 PROCEDURE — 99024 POSTOP FOLLOW-UP VISIT: CPT

## 2025-07-02 ENCOUNTER — NON-APPOINTMENT (OUTPATIENT)
Age: 75
End: 2025-07-02

## 2025-07-02 LAB
ALBUMIN SERPL ELPH-MCNC: 4.4 G/DL
ALP BLD-CCNC: 78 U/L
ALT SERPL-CCNC: 16 U/L
AST SERPL-CCNC: 21 U/L
BILIRUB DIRECT SERPL-MCNC: 0.2 MG/DL
BILIRUB INDIRECT SERPL-MCNC: 0.3 MG/DL
BILIRUB SERPL-MCNC: 0.5 MG/DL
PROT SERPL-MCNC: 6.8 G/DL

## 2025-07-03 ENCOUNTER — APPOINTMENT (OUTPATIENT)
Dept: SURGERY | Facility: CLINIC | Age: 75
End: 2025-07-03